# Patient Record
Sex: FEMALE | URBAN - METROPOLITAN AREA
[De-identification: names, ages, dates, MRNs, and addresses within clinical notes are randomized per-mention and may not be internally consistent; named-entity substitution may affect disease eponyms.]

---

## 2023-08-17 ENCOUNTER — APPOINTMENT (RX ONLY)
Dept: URBAN - METROPOLITAN AREA CLINIC 23 | Facility: CLINIC | Age: 38
Setting detail: DERMATOLOGY
End: 2023-08-17

## 2023-08-17 DIAGNOSIS — L81.4 OTHER MELANIN HYPERPIGMENTATION: ICD-10-CM | Status: INADEQUATELY CONTROLLED

## 2023-08-17 DIAGNOSIS — L81.0 POSTINFLAMMATORY HYPERPIGMENTATION: ICD-10-CM

## 2023-08-17 DIAGNOSIS — Z71.89 OTHER SPECIFIED COUNSELING: ICD-10-CM

## 2023-08-17 PROCEDURE — ? PRESCRIPTION

## 2023-08-17 PROCEDURE — 99204 OFFICE O/P NEW MOD 45 MIN: CPT

## 2023-08-17 PROCEDURE — ? SUNSCREEN RECOMMENDATIONS

## 2023-08-17 PROCEDURE — ? COUNSELING

## 2023-08-17 PROCEDURE — ? PRESCRIPTION MEDICATION MANAGEMENT

## 2023-08-17 ASSESSMENT — LOCATION SIMPLE DESCRIPTION DERM: LOCATION SIMPLE: RIGHT BREAST

## 2023-08-17 ASSESSMENT — LOCATION DETAILED DESCRIPTION DERM: LOCATION DETAILED: RIGHT LATERAL BREAST 6-7:00 REGION

## 2023-08-17 ASSESSMENT — LOCATION ZONE DERM: LOCATION ZONE: TRUNK

## 2023-08-17 NOTE — PROCEDURE: PRESCRIPTION MEDICATION MANAGEMENT
Initiate Treatment: Hydrocortisone/Tretinoin/Hydroquinone \\nQuantity: 30.0 g  Days Supply: 30\\nHydrocortisone/Tretinoin/Hydroquinone (0.05% tretinoin, 0.5% hydrocortisone, 8% hydroquinone)\\nSig: Apply to discolored skin qhs x 3 months, stop for 2 months, then repeat. Strict sun protection.
Render In Strict Bullet Format?: No
Plan: Patient had breast augmentation 2 years ago; patient already followed up with plastic surgeon \\nPatient states she has used bleaching creams in the past; tri-herbert was prescribed. Patient used otc 4% hydroquinone from [place]. Patient states she has been treating discoloration for 1.5 years with various products\\n\\nno ochronosis appreciated\\nhyperpigmentation and PIH that eill ilkely be difficult to treat\\nrec combining with in office procedures- chem peels/ laser treatments, microneedling\\nreferred to [facility]\\n\\nRecommended SkinBetter even tone -QAM
Detail Level: Zone